# Patient Record
(demographics unavailable — no encounter records)

---

## 2025-05-20 NOTE — REASON FOR VISIT
[Symptom and Test Evaluation] : symptom and test evaluation [Cardiac Failure] : cardiac failure [Arrhythmia/ECG Abnorrmalities] : arrhythmia/ECG abnormalities [Hyperlipidemia] : hyperlipidemia [Hypertension] : hypertension

## 2025-05-20 NOTE — HISTORY OF PRESENT ILLNESS
[FreeTextEntry1] : 70 yo male with pmhx as below was admitted to [facility] with acute chf/a.fib,  underwent successful MAK guided CV.  Hospital course was sign for mild hf symptoms, pt was started on guideline directed rx with a/c, b blocker, acei and a statin and sent home with cardiology f/up. Here for a f/up visit, doing well, no c/o cp, sob, maldonado, pnd, orthopnea, or peripheral edema.  No palpitations or exertional symptoms, no syncope or dizziness. + weight gain Et remains stable

## 2025-05-20 NOTE — ASSESSMENT
[FreeTextEntry1] : # UNSPECIFIED ESSENTIAL HYPERTENSION (I10): # ATRIAL FIBRILLATION (I48.91): # HEART FAILURE UNSPECIFIED (I50.9):  68 yo male with pmhx and presentation as above CM/CHF Permanent a. fib HTN  Cont present meds 4/22 echo - nl lv fnx, mild MR ecg-  a.fib, no symptoms, cont with rate control and a/c repeat labs  f/up with pmd for preventive care measures Increase caloric intake, target weight 160s Aggressive risk modif Heart healthy diet RTC 6 months

## 2025-05-20 NOTE — PHYSICAL EXAM
[No Acute Distress] : no acute distress [Normal Venous Pressure] : normal venous pressure [No Carotid Bruit] : no carotid bruit [Normal S1, S2] : normal S1, S2 [No Rub] : no rub [No Gallop] : no gallop [Murmur] : murmur [Clear Lung Fields] : clear lung fields [Good Air Entry] : good air entry [No Respiratory Distress] : no respiratory distress  [Soft] : abdomen soft [Non Tender] : non-tender [No Masses/organomegaly] : no masses/organomegaly [Normal Bowel Sounds] : normal bowel sounds [Normal Gait] : normal gait [No Edema] : no edema [No Cyanosis] : no cyanosis [No Clubbing] : no clubbing [No Varicosities] : no varicosities [No Rash] : no rash [No Skin Lesions] : no skin lesions [Moves all extremities] : moves all extremities [No Focal Deficits] : no focal deficits [Normal Speech] : normal speech [Alert and Oriented] : alert and oriented [Normal memory] : normal memory [de-identified] : 2/6 syst m+ [de-identified] : irregular